# Patient Record
Sex: MALE | Race: WHITE | Employment: OTHER | URBAN - METROPOLITAN AREA
[De-identification: names, ages, dates, MRNs, and addresses within clinical notes are randomized per-mention and may not be internally consistent; named-entity substitution may affect disease eponyms.]

---

## 2018-03-03 ENCOUNTER — HOSPITAL ENCOUNTER (OUTPATIENT)
Dept: CT IMAGING | Facility: HOSPITAL | Age: 68
Discharge: HOME | End: 2018-03-03
Attending: INTERNAL MEDICINE

## 2018-03-03 VITALS — HEART RATE: 97 BPM | DIASTOLIC BLOOD PRESSURE: 103 MMHG | SYSTOLIC BLOOD PRESSURE: 144 MMHG

## 2018-03-03 DIAGNOSIS — Z13.6 SCREENING FOR CARDIOVASCULAR CONDITION: ICD-10-CM

## 2023-06-05 ENCOUNTER — HOSPITAL ENCOUNTER (OUTPATIENT)
Dept: CT IMAGING | Facility: HOSPITAL | Age: 73
End: 2023-06-05
Attending: INTERNAL MEDICINE

## 2023-06-05 DIAGNOSIS — Z13.6 SCREENING FOR CARDIOVASCULAR CONDITION: ICD-10-CM

## 2023-06-05 NOTE — PROGRESS NOTES
Date of Service 6/5/2023    [facility]  Date of Birth 9/6/1950    Patient Age: 81year old    PCP: Petros Hobbs MD  [id number] [address] [address]    Consult Type  Type Scan/Screening: Heart Scan  Preliminary Heart Scan Score: 0              Lipid Profile  No Lipid results on file in last 5 years . Nurse Review  Risk factor information and results reviewed with Nurse: Yes    Recommended Follow Up:  Consult your physician regarding[de-identified]   Final Heart Scan Report; Discuss potential for Incidental Finding          Recommendations for Change:  Nutrition Changes: Low Saturated Fat;Low Fat Dairy; Increase Fiber     Cholesterol Modification (goal of therapy depends upon your risk):   Decrease LDL (Lousy/Bad) Ideal <100     Exercise: Enhance Current Program           Repeat Heart Scan: 5 years if Calcium Score is 0.0          [facility] Recommended Resources:  Recommended Resources: Upcoming Classes, Medical Services and [facility]. [facility]. Sabine Penny, RN        Please Contact the Nurse Heart Line with any Questions or Concerns [phone].

## 2023-11-14 ENCOUNTER — TELEPHONE (OUTPATIENT)
Dept: CARDIOLOGY | Age: 73
End: 2023-11-14